# Patient Record
Sex: FEMALE | Race: WHITE | NOT HISPANIC OR LATINO | Employment: FULL TIME | URBAN - METROPOLITAN AREA
[De-identification: names, ages, dates, MRNs, and addresses within clinical notes are randomized per-mention and may not be internally consistent; named-entity substitution may affect disease eponyms.]

---

## 2024-06-04 ENCOUNTER — LAB VISIT (OUTPATIENT)
Dept: LAB | Facility: HOSPITAL | Age: 24
End: 2024-06-04
Payer: COMMERCIAL

## 2024-06-04 ENCOUNTER — OFFICE VISIT (OUTPATIENT)
Dept: FAMILY MEDICINE | Facility: CLINIC | Age: 24
End: 2024-06-04
Payer: COMMERCIAL

## 2024-06-04 VITALS
OXYGEN SATURATION: 99 % | HEART RATE: 78 BPM | SYSTOLIC BLOOD PRESSURE: 135 MMHG | BODY MASS INDEX: 40.15 KG/M2 | HEIGHT: 61 IN | DIASTOLIC BLOOD PRESSURE: 88 MMHG | RESPIRATION RATE: 18 BRPM | WEIGHT: 212.63 LBS | TEMPERATURE: 98 F

## 2024-06-04 DIAGNOSIS — F41.9 ANXIETY: ICD-10-CM

## 2024-06-04 DIAGNOSIS — Z00.00 ENCOUNTER FOR WELLNESS EXAMINATION: Primary | ICD-10-CM

## 2024-06-04 DIAGNOSIS — F33.1 MODERATE EPISODE OF RECURRENT MAJOR DEPRESSIVE DISORDER: ICD-10-CM

## 2024-06-04 DIAGNOSIS — R10.9 ABDOMINAL PAIN, UNSPECIFIED ABDOMINAL LOCATION: ICD-10-CM

## 2024-06-04 DIAGNOSIS — Z00.00 ENCOUNTER FOR WELLNESS EXAMINATION: ICD-10-CM

## 2024-06-04 DIAGNOSIS — R53.83 FATIGUE, UNSPECIFIED TYPE: ICD-10-CM

## 2024-06-04 LAB
C TRACH DNA SPEC QL NAA+PROBE: NOT DETECTED
ESTRADIOL SERPL HS-MCNC: 43 PG/ML
FERRITIN SERPL-MCNC: 41.23 NG/ML (ref 4.63–204)
FSH SERPL-ACNC: 4.52 MIU/ML
LH SERPL-ACNC: 5.77 MIU/ML
N GONORRHOEA DNA SPEC QL NAA+PROBE: NOT DETECTED
PROGEST SERPL-MCNC: <0.5 NG/ML
SOURCE (OHS): NORMAL

## 2024-06-04 PROCEDURE — 1159F MED LIST DOCD IN RCRD: CPT | Mod: CPTII,,,

## 2024-06-04 PROCEDURE — 82728 ASSAY OF FERRITIN: CPT

## 2024-06-04 PROCEDURE — 83001 ASSAY OF GONADOTROPIN (FSH): CPT

## 2024-06-04 PROCEDURE — 99385 PREV VISIT NEW AGE 18-39: CPT | Mod: S$PBB,,,

## 2024-06-04 PROCEDURE — 36415 COLL VENOUS BLD VENIPUNCTURE: CPT

## 2024-06-04 PROCEDURE — 99205 OFFICE O/P NEW HI 60 MIN: CPT | Mod: PBBFAC,PN

## 2024-06-04 PROCEDURE — 82670 ASSAY OF TOTAL ESTRADIOL: CPT

## 2024-06-04 PROCEDURE — 87591 N.GONORRHOEAE DNA AMP PROB: CPT

## 2024-06-04 PROCEDURE — 83002 ASSAY OF GONADOTROPIN (LH): CPT

## 2024-06-04 PROCEDURE — 99204 OFFICE O/P NEW MOD 45 MIN: CPT | Mod: 25,S$PBB,,

## 2024-06-04 PROCEDURE — 3008F BODY MASS INDEX DOCD: CPT | Mod: CPTII,,,

## 2024-06-04 PROCEDURE — 3079F DIAST BP 80-89 MM HG: CPT | Mod: CPTII,,,

## 2024-06-04 PROCEDURE — 84144 ASSAY OF PROGESTERONE: CPT

## 2024-06-04 PROCEDURE — 1160F RVW MEDS BY RX/DR IN RCRD: CPT | Mod: CPTII,,,

## 2024-06-04 PROCEDURE — 3075F SYST BP GE 130 - 139MM HG: CPT | Mod: CPTII,,,

## 2024-06-04 RX ORDER — HYDROXYZINE PAMOATE 25 MG/1
25 CAPSULE ORAL EVERY 8 HOURS PRN
Qty: 90 CAPSULE | Refills: 1 | Status: SHIPPED | OUTPATIENT
Start: 2024-06-04 | End: 2024-09-02

## 2024-06-04 RX ORDER — FLUOXETINE 10 MG/1
CAPSULE ORAL
Qty: 56 CAPSULE | Refills: 0 | Status: SHIPPED | OUTPATIENT
Start: 2024-06-04 | End: 2024-07-09

## 2024-06-04 NOTE — ASSESSMENT & PLAN NOTE
Start Prozac, additionally patient will utilize Vistaril 25 mg t.i.d. p.r.n. anxiety.  Practice deep breathing or abdominal breathing exercises when anxiety occurs.  Exercise daily. Get sunlight daily.  Avoid caffeine, alcohol and stimulants.  Practice positive phrases and repeat throughout the day, yoga, lavender scents or Chamomile tea will help anxiety.  Set healthy boundaries, avoid people and conversations that increase stress.  Reports any symptoms of suicidal or homicidal ideations immediately, if clinic is closed go to nearest emergency room.

## 2024-06-04 NOTE — ASSESSMENT & PLAN NOTE
New chronic condition, start Prozac 10 mg for 2 weeks, increase to 20 mg for the remainder until follow-up in 4 weeks.  Read positive daily meditations, avoid negative media, set healthy boundaries.  Exercise daily, keep consistent sleep pattern, eat a healthy diet.  Establish good social support, make changes to reduce stress.  Reports any symptoms of suicidal/homicidal ideations or self harm immediately, if clinic is closed go to nearest emergency room.    Discussed use of SSRIs may activate thoughts of suicide.   If patient has any thoughts of harm to self or others stop medication and call clinic or go to ER.   Go to ER for symptoms of confusion, agitation, disorientation, restlessness, fever, tremors, while taking SSRIs as this may be signs of serotonin syndrome.

## 2024-06-04 NOTE — PROGRESS NOTES
Patient Name: Lilly Wray     : 2000    MRN: [id number]     Subjective:     Patient ID: Lilly Wray is a 24 y.o. female.    Chief Complaint:   Chief Complaint   Patient presents with    Establish Care     New patient. Establish care. C/o fatigued, weight gain. C/o depression and anxiety. Family Hx of thyroid issues. Wants to talk about PCOS.         HPI: 2024:  Presents to clinic today to establish care, patient has not been seen by any primary care provider in a few years.  Has been seen by a gynecologist previously, obtaining release of information about patient's Pap smear.  States that she would Pap smear performed on the same day her Nexplanon was placed in .  She is also complaining of IBS, states that she was diagnosed with this in her youth.  States it is not occur every day in her main symptom is diarrhea from this.  States that she normally takes Imodium and tries to avoid the foods that bother her.  Does complain of a vague intermittent abdominal pain that she can not find a correlation to cause of the pain.  States that it is intermittent and will come and go.  He has not having abdominal pain currently.  States that she also is concerned about weight gain, states that she tries to eat healthy and has inability to lose weight.  Patient denies chest pain, palpitations, and shortness of breath.  Patient denies fever, night sweats, chills, nausea, vomiting, diarrhea, constipation, weight loss, and changes in appetite.    Also has new complaint of anxiety and depression.  States that she has suffered with this for a long time but has never required medication.  Would like to start medication today.  Denies SI/HI, loki hallucinations.  Does have good support system at home, endorses lots of family stressors.        ROS:       12 point review of systems conducted, negative except as stated in the history of present illness. See HPI for details.    History:     Past Medical  "History:   Diagnosis Date    IBS (irritable bowel syndrome)     Scoliosis         History reviewed. No pertinent surgical history.    No family history on file.     Social History     Tobacco Use    Smoking status: Never    Smokeless tobacco: Never   Substance and Sexual Activity    Alcohol use: Yes     Comment: Occ    Drug use: Not Currently    Sexual activity: Not Currently     Birth control/protection: Implant       Current Outpatient Medications   Medication Instructions    FLUoxetine 10 MG capsule Take 1 capsule (10 mg total) by mouth once daily for 14 days, THEN 2 capsules (20 mg total) once daily for 21 days.    hydrOXYzine pamoate (VISTARIL) 25 mg, Oral, Every 8 hours PRN        Review of patient's allergies indicates:  No Known Allergies    Objective:     Visit Vitals  /88 (BP Location: Left arm, Patient Position: Sitting)   Pulse 78   Temp 98.4 °F (36.9 °C) (Oral)   Resp 18   Ht 5' 1" (1.549 m)   Wt 96.4 kg (212 lb 9.6 oz)   LMP  (LMP Unknown)   SpO2 99%   BMI 40.17 kg/m²       Physical Examination:     Physical Exam  Vitals reviewed.   Constitutional:       General: She is not in acute distress.     Appearance: Normal appearance. She is obese. She is not ill-appearing.   HENT:      Head: Normocephalic.      Right Ear: Tympanic membrane, ear canal and external ear normal.      Left Ear: Tympanic membrane, ear canal and external ear normal.      Nose: Nose normal.      Mouth/Throat:      Mouth: Mucous membranes are moist.      Pharynx: Oropharynx is clear.   Eyes:      Extraocular Movements: Extraocular movements intact.      Conjunctiva/sclera: Conjunctivae normal.      Pupils: Pupils are equal, round, and reactive to light.   Cardiovascular:      Rate and Rhythm: Normal rate and regular rhythm.      Pulses: Normal pulses.      Heart sounds: Normal heart sounds.   Pulmonary:      Effort: Pulmonary effort is normal. No respiratory distress.      Breath sounds: Normal breath sounds.   Abdominal:      " General: Abdomen is flat. Bowel sounds are normal. There is no distension.      Palpations: Abdomen is soft.      Tenderness: There is no abdominal tenderness.   Musculoskeletal:         General: Normal range of motion.      Cervical back: Normal range of motion and neck supple.   Skin:     General: Skin is warm and dry.   Neurological:      General: No focal deficit present.      Mental Status: She is alert and oriented to person, place, and time.   Psychiatric:         Mood and Affect: Mood normal.         Behavior: Behavior normal.         Assessment:          ICD-10-CM ICD-9-CM   1. Encounter for wellness examination  Z00.00 V70.0   2. Fatigue, unspecified type  R53.83 780.79   3. Abdominal pain, unspecified abdominal location  R10.9 789.00   4. Anxiety  F41.9 300.00   5. Moderate episode of recurrent major depressive disorder  F33.1 296.32        Plan:     1. Encounter for wellness examination  -     TSH; Future; Expected date: 06/04/2024  -     T4, Free; Future; Expected date: 06/04/2024  -     Hemoglobin A1C; Future; Expected date: 06/04/2024  -     SYPHILIS ANTIBODY (WITH REFLEX RPR); Future; Expected date: 06/04/2024  -     Hepatitis Panel, Acute; Future; Expected date: 06/04/2024  -     Lipid Panel; Future; Expected date: 06/04/2024  -     CBC Auto Differential; Future; Expected date: 06/04/2024  -     Comprehensive Metabolic Panel; Future; Expected date: 06/04/2024  -     HIV 1/2 Ag/Ab (4th Gen); Future; Expected date: 06/04/2024  -     Vitamin D; Future; Expected date: 06/04/2024  -     Chlamydia/GC, PCR; Future; Expected date: 06/04/2024  -     Urinalysis, Reflex to Urine Culture; Future; Expected date: 06/04/2024  -     Ambulatory referral/consult to Obstetrics / Gynecology; Future; Expected date: 06/04/2024    2. Fatigue, unspecified type  Comments:  Hormone panel today, wellness labs.  Orders:  -     Estradiol; Future; Expected date: 06/04/2024  -     Ferritin; Future; Expected date: 06/04/2024  -      Luteinizing Hormone; Future; Expected date: 06/04/2024  -     Progesterone; Future; Expected date: 06/04/2024  -     Follicle Stimulating Hormone; Future; Expected date: 06/04/2024  -     Ambulatory referral/consult to Obstetrics / Gynecology; Future; Expected date: 06/04/2024    3. Abdominal pain, unspecified abdominal location  Comments:  Referral to the Gastro clinic to establish care, ED precautions discussed  Orders:  -     Ambulatory referral/consult to Gastroenterology; Future; Expected date: 06/04/2024    4. Anxiety  Assessment & Plan:  Start Prozac, additionally patient will utilize Vistaril 25 mg t.i.d. p.r.n. anxiety.  Practice deep breathing or abdominal breathing exercises when anxiety occurs.  Exercise daily. Get sunlight daily.  Avoid caffeine, alcohol and stimulants.  Practice positive phrases and repeat throughout the day, yoga, lavender scents or Chamomile tea will help anxiety.  Set healthy boundaries, avoid people and conversations that increase stress.  Reports any symptoms of suicidal or homicidal ideations immediately, if clinic is closed go to nearest emergency room.        Orders:  -     hydrOXYzine pamoate (VISTARIL) 25 MG Cap; Take 1 capsule (25 mg total) by mouth every 8 (eight) hours as needed (anxiety).  Dispense: 90 capsule; Refill: 1    5. Moderate episode of recurrent major depressive disorder  Assessment & Plan:  New chronic condition, start Prozac 10 mg for 2 weeks, increase to 20 mg for the remainder until follow-up in 4 weeks.  Read positive daily meditations, avoid negative media, set healthy boundaries.  Exercise daily, keep consistent sleep pattern, eat a healthy diet.  Establish good social support, make changes to reduce stress.  Reports any symptoms of suicidal/homicidal ideations or self harm immediately, if clinic is closed go to nearest emergency room.    Discussed use of SSRIs may activate thoughts of suicide.   If patient has any thoughts of harm to self or others  stop medication and call clinic or go to ER.   Go to ER for symptoms of confusion, agitation, disorientation, restlessness, fever, tremors, while taking SSRIs as this may be signs of serotonin syndrome.       Orders:  -     FLUoxetine 10 MG capsule; Take 1 capsule (10 mg total) by mouth once daily for 14 days, THEN 2 capsules (20 mg total) once daily for 21 days.  Dispense: 56 capsule; Refill: 0         Follow up in about 4 weeks (around 7/2/2024) for med change fu, review labs, MARIAN/PHQ.    Future Appointments   Date Time Provider Department Center   7/2/2024  8:30 AM Coreen Lewis NP [facility]        Coreen Lewis NP

## 2024-06-05 ENCOUNTER — PATIENT MESSAGE (OUTPATIENT)
Dept: FAMILY MEDICINE | Facility: CLINIC | Age: 24
End: 2024-06-05
Payer: COMMERCIAL

## 2024-06-07 ENCOUNTER — LAB VISIT (OUTPATIENT)
Dept: LAB | Facility: HOSPITAL | Age: 24
End: 2024-06-07
Payer: COMMERCIAL

## 2024-06-07 ENCOUNTER — PATIENT MESSAGE (OUTPATIENT)
Dept: FAMILY MEDICINE | Facility: CLINIC | Age: 24
End: 2024-06-07
Payer: COMMERCIAL

## 2024-06-07 DIAGNOSIS — Z00.00 ENCOUNTER FOR WELLNESS EXAMINATION: ICD-10-CM

## 2024-06-07 LAB
25(OH)D3+25(OH)D2 SERPL-MCNC: 10 NG/ML (ref 30–80)
ALBUMIN SERPL-MCNC: 3.9 G/DL (ref 3.5–5)
ALBUMIN/GLOB SERPL: 1.1 RATIO (ref 1.1–2)
ALP SERPL-CCNC: 85 UNIT/L (ref 40–150)
ALT SERPL-CCNC: 24 UNIT/L (ref 0–55)
ANION GAP SERPL CALC-SCNC: 9 MEQ/L
AST SERPL-CCNC: 20 UNIT/L (ref 5–34)
BACTERIA #/AREA URNS AUTO: ABNORMAL /HPF
BASOPHILS # BLD AUTO: 0.07 X10(3)/MCL
BASOPHILS NFR BLD AUTO: 1 %
BILIRUB SERPL-MCNC: 0.5 MG/DL
BILIRUB UR QL STRIP.AUTO: NEGATIVE
BUN SERPL-MCNC: 8.4 MG/DL (ref 7–18.7)
CALCIUM SERPL-MCNC: 9.4 MG/DL (ref 8.4–10.2)
CHLORIDE SERPL-SCNC: 106 MMOL/L (ref 98–107)
CHOLEST SERPL-MCNC: 171 MG/DL
CHOLEST/HDLC SERPL: 4 {RATIO} (ref 0–5)
CLARITY UR: CLEAR
CO2 SERPL-SCNC: 23 MMOL/L (ref 22–29)
COLOR UR AUTO: ABNORMAL
CREAT SERPL-MCNC: 0.82 MG/DL (ref 0.55–1.02)
CREAT/UREA NIT SERPL: 10
EOSINOPHIL # BLD AUTO: 0.32 X10(3)/MCL (ref 0–0.9)
EOSINOPHIL NFR BLD AUTO: 4.5 %
ERYTHROCYTE [DISTWIDTH] IN BLOOD BY AUTOMATED COUNT: 13.2 % (ref 11.5–17)
EST. AVERAGE GLUCOSE BLD GHB EST-MCNC: 131.2 MG/DL
GFR SERPLBLD CREATININE-BSD FMLA CKD-EPI: >60 ML/MIN/1.73/M2
GLOBULIN SER-MCNC: 3.6 GM/DL (ref 2.4–3.5)
GLUCOSE SERPL-MCNC: 138 MG/DL (ref 74–100)
GLUCOSE UR QL STRIP: NORMAL
HAV IGM SERPL QL IA: NONREACTIVE
HBA1C MFR BLD: 6.2 %
HBV CORE IGM SERPL QL IA: NONREACTIVE
HBV SURFACE AG SERPL QL IA: NONREACTIVE
HCT VFR BLD AUTO: 46.7 % (ref 37–47)
HCV AB SERPL QL IA: NONREACTIVE
HDLC SERPL-MCNC: 48 MG/DL (ref 35–60)
HGB BLD-MCNC: 15.6 G/DL (ref 12–16)
HGB UR QL STRIP: NEGATIVE
HIV 1+2 AB+HIV1 P24 AG SERPL QL IA: NONREACTIVE
HYALINE CASTS #/AREA URNS LPF: ABNORMAL /LPF
IMM GRANULOCYTES # BLD AUTO: 0.03 X10(3)/MCL (ref 0–0.04)
IMM GRANULOCYTES NFR BLD AUTO: 0.4 %
KETONES UR QL STRIP: NEGATIVE
LDLC SERPL CALC-MCNC: 105 MG/DL (ref 50–140)
LEUKOCYTE ESTERASE UR QL STRIP: NEGATIVE
LYMPHOCYTES # BLD AUTO: 2.68 X10(3)/MCL (ref 0.6–4.6)
LYMPHOCYTES NFR BLD AUTO: 38 %
MCH RBC QN AUTO: 30.5 PG (ref 27–31)
MCHC RBC AUTO-ENTMCNC: 33.4 G/DL (ref 33–36)
MCV RBC AUTO: 91.4 FL (ref 80–94)
MONOCYTES # BLD AUTO: 0.46 X10(3)/MCL (ref 0.1–1.3)
MONOCYTES NFR BLD AUTO: 6.5 %
MUCOUS THREADS URNS QL MICRO: ABNORMAL /LPF
NEUTROPHILS # BLD AUTO: 3.5 X10(3)/MCL (ref 2.1–9.2)
NEUTROPHILS NFR BLD AUTO: 49.6 %
NITRITE UR QL STRIP: NEGATIVE
NRBC BLD AUTO-RTO: 0 %
PH UR STRIP: 5.5 [PH]
PLATELET # BLD AUTO: 360 X10(3)/MCL (ref 130–400)
PMV BLD AUTO: 11.4 FL (ref 7.4–10.4)
POTASSIUM SERPL-SCNC: 4.1 MMOL/L (ref 3.5–5.1)
PROT SERPL-MCNC: 7.5 GM/DL (ref 6.4–8.3)
PROT UR QL STRIP: NEGATIVE
RBC # BLD AUTO: 5.11 X10(6)/MCL (ref 4.2–5.4)
RBC #/AREA URNS AUTO: ABNORMAL /HPF
SODIUM SERPL-SCNC: 138 MMOL/L (ref 136–145)
SP GR UR STRIP.AUTO: 1.02 (ref 1–1.03)
SQUAMOUS #/AREA URNS LPF: ABNORMAL /HPF
T PALLIDUM AB SER QL: NONREACTIVE
T4 FREE SERPL-MCNC: 1.01 NG/DL (ref 0.7–1.48)
TRIGL SERPL-MCNC: 91 MG/DL (ref 37–140)
TSH SERPL-ACNC: 1.32 UIU/ML (ref 0.35–4.94)
UROBILINOGEN UR STRIP-ACNC: NORMAL
VLDLC SERPL CALC-MCNC: 18 MG/DL
WBC # SPEC AUTO: 7.06 X10(3)/MCL (ref 4.5–11.5)
WBC #/AREA URNS AUTO: ABNORMAL /HPF

## 2024-06-07 PROCEDURE — 85025 COMPLETE CBC W/AUTO DIFF WBC: CPT

## 2024-06-07 RX ORDER — ERGOCALCIFEROL 1.25 MG/1
50000 CAPSULE ORAL
Qty: 12 CAPSULE | Refills: 0 | Status: SHIPPED | OUTPATIENT
Start: 2024-06-07 | End: 2024-08-24

## 2024-06-26 ENCOUNTER — E-VISIT (OUTPATIENT)
Dept: FAMILY MEDICINE | Facility: CLINIC | Age: 24
End: 2024-06-26
Payer: COMMERCIAL

## 2024-06-26 ENCOUNTER — HOSPITAL ENCOUNTER (OUTPATIENT)
Dept: RADIOLOGY | Facility: HOSPITAL | Age: 24
Discharge: HOME | End: 2024-06-26
Payer: COMMERCIAL

## 2024-06-26 DIAGNOSIS — M79.645 FINGER PAIN, LEFT: ICD-10-CM

## 2024-06-26 DIAGNOSIS — M79.645 FINGER PAIN, LEFT: Primary | ICD-10-CM

## 2024-06-26 PROCEDURE — 73140 X-RAY EXAM OF FINGER(S): CPT | Mod: TC,PN,LT

## 2024-06-26 NOTE — PROGRESS NOTES
Patient ID: Lilly Wray is a 24 y.o. female.    Chief Complaint: left index finger pain    The patient initiated a request through [facility] on 6/26/2024 for evaluation and management with a chief complaint of left index finger pain     I evaluated the questionnaire submission on 6/26/2024.    [facility]    6/26/2024  2:11 PM CDT - Filed by Patient   Do you agree to participate in an E-Visit? Yes   If you have any of the following symptoms, please present to your local emergency room or call 911:  I acknowledge   Are you pregnant, could you be pregnant, or are you breast feeding? None of the above   What is the main issue you would like addressed today? Feeling a lump on finger and pain   Please describe your symptoms When finger gets bumped on a certain spot its very painful   Where is your problem located? Near the tip of index finger   How severe are your symptoms? Moderate   Have you had these symptoms before? No   How long have you been having these symptoms? For one to four weeks   Please list any medications or treatments you have used for your condition and indicate if it was effective or not. None   What makes this feel better? Nothing   What makes this feel worse? Bumping or touching/moving an object at the wrong angle. I noticed putting something cold on it when in pain makes it hurt more   Are these symptoms related to a condition that you currently have? No   Please describe any probable cause for these symptoms Maybe finger injury   Provide any additional information you feel is important.    Please attach any relevant images or files    Are you able to take your vital signs? No         Encounter Diagnosis   Name Primary?    Finger pain, left Yes        Orders Placed This Encounter   Procedures    X-Ray Finger 2 or More Views Left     Standing Status:   Future     Number of Occurrences:   1     Standing Expiration Date:   6/26/2025     Order Specific Question:   May the Radiologist  modify the order per protocol to meet the clinical needs of the patient?     Answer:   Yes     Order Specific Question:   Release to patient     Answer:   Immediate            Follow up if symptoms worsen or fail to improve.      E-Visit Time Tracking:    Day 1 Time (in minutes): 7  Day 2 Time (in minutes): 1    Total Time (in minutes): 8

## 2024-07-02 ENCOUNTER — PATIENT MESSAGE (OUTPATIENT)
Dept: FAMILY MEDICINE | Facility: CLINIC | Age: 24
End: 2024-07-02

## 2024-07-02 ENCOUNTER — OFFICE VISIT (OUTPATIENT)
Dept: FAMILY MEDICINE | Facility: CLINIC | Age: 24
End: 2024-07-02
Payer: COMMERCIAL

## 2024-07-02 VITALS
DIASTOLIC BLOOD PRESSURE: 76 MMHG | TEMPERATURE: 98 F | BODY MASS INDEX: 39.78 KG/M2 | RESPIRATION RATE: 18 BRPM | HEART RATE: 77 BPM | WEIGHT: 210.69 LBS | OXYGEN SATURATION: 99 % | SYSTOLIC BLOOD PRESSURE: 113 MMHG | HEIGHT: 61 IN

## 2024-07-02 DIAGNOSIS — J45.20 MILD INTERMITTENT ASTHMA WITHOUT COMPLICATION: Primary | ICD-10-CM

## 2024-07-02 DIAGNOSIS — Z00.00 ENCOUNTER FOR WELLNESS EXAMINATION: ICD-10-CM

## 2024-07-02 DIAGNOSIS — F33.1 MODERATE EPISODE OF RECURRENT MAJOR DEPRESSIVE DISORDER: ICD-10-CM

## 2024-07-02 DIAGNOSIS — N89.8 DISCHARGE OF VAGINA: ICD-10-CM

## 2024-07-02 PROBLEM — J45.909 ASTHMA: Status: ACTIVE | Noted: 2024-07-02

## 2024-07-02 LAB
CLUE CELLS VAG QL WET PREP: ABNORMAL
T VAGINALIS VAG QL WET PREP: ABNORMAL
WBC #/AREA VAG WET PREP: ABNORMAL
YEAST SPEC QL WET PREP: ABNORMAL

## 2024-07-02 PROCEDURE — 3074F SYST BP LT 130 MM HG: CPT | Mod: CPTII,,,

## 2024-07-02 PROCEDURE — 3044F HG A1C LEVEL LT 7.0%: CPT | Mod: CPTII,,,

## 2024-07-02 PROCEDURE — 87210 SMEAR WET MOUNT SALINE/INK: CPT

## 2024-07-02 PROCEDURE — 99214 OFFICE O/P EST MOD 30 MIN: CPT | Mod: S$PBB,,,

## 2024-07-02 PROCEDURE — 1160F RVW MEDS BY RX/DR IN RCRD: CPT | Mod: CPTII,,,

## 2024-07-02 PROCEDURE — 1159F MED LIST DOCD IN RCRD: CPT | Mod: CPTII,,,

## 2024-07-02 PROCEDURE — 99214 OFFICE O/P EST MOD 30 MIN: CPT | Mod: PBBFAC,PN

## 2024-07-02 PROCEDURE — 3078F DIAST BP <80 MM HG: CPT | Mod: CPTII,,,

## 2024-07-02 PROCEDURE — 3008F BODY MASS INDEX DOCD: CPT | Mod: CPTII,,,

## 2024-07-02 RX ORDER — ALBUTEROL SULFATE 90 UG/1
2 AEROSOL, METERED RESPIRATORY (INHALATION) EVERY 6 HOURS PRN
Qty: 18 G | Refills: 5 | Status: SHIPPED | OUTPATIENT
Start: 2024-07-02 | End: 2025-07-02

## 2024-07-02 RX ORDER — FLUOXETINE HYDROCHLORIDE 20 MG/1
20 CAPSULE ORAL DAILY
Qty: 90 CAPSULE | Refills: 1 | Status: SHIPPED | OUTPATIENT
Start: 2024-07-02 | End: 2024-12-29

## 2024-07-02 NOTE — PROGRESS NOTES
Patient Name: Lilly Wray     : 2000    MRN: [id number]     Subjective:     Patient ID: Lilly Wray is a 24 y.o. female.    Chief Complaint:   Chief Complaint   Patient presents with    Follow-up     F/u appointment. States isn't as fatigued. Requesting albuterol inhaler. Hx of asthma.         HPI: 2024:  Patient presents to clinic today to review labs. Discussed labs at length with patient and all questions were answered to patients understanding. Patient has no new acute complaints today.  States that she does have history of asthma in his requesting asthma inhaler, has not had flare up requiring treatment but would like to be safe.  Does not have shortness a breath or wheezing today.  States that anxiety has been much improved, not as fatigued as she previously was since starting Prozac.  Doing well overall.  The clinic in about 2 weeks if she would like to increase his medication.  Also requesting refill of albuterol inhaler, states that she has not currently having any shortness a breath or wheezing but did have this diagnosis in her youth and does not have a rescue inhaler.  Additionally patient is complaining vaginal discharge, has been diagnosed with BV and yeast infection in the past possible cervicitis, still attempting to get patient set up with a gynecologist as well.      2024:  Presents to clinic today to establish care, patient has not been seen by any primary care provider in a few years.  Has been seen by a gynecologist previously, obtaining release of information about patient's Pap smear.  States that she would Pap smear performed on the same day her Nexplanon was placed in .  She is also complaining of IBS, states that she was diagnosed with this in her youth.  States it is not occur every day in her main symptom is diarrhea from this.  States that she normally takes Imodium and tries to avoid the foods that bother her.  Does complain of a vague intermittent  "abdominal pain that she can not find a correlation to cause of the pain.  States that it is intermittent and will come and go.  He has not having abdominal pain currently.  States that she also is concerned about weight gain, states that she tries to eat healthy and has inability to lose weight.  Patient denies chest pain, palpitations, and shortness of breath.  Patient denies fever, night sweats, chills, nausea, vomiting, diarrhea, constipation, weight loss, and changes in appetite.    Also has new complaint of anxiety and depression.  States that she has suffered with this for a long time but has never required medication.  Would like to start medication today.  Denies SI/HI, loki hallucinations.  Does have good support system at home, endorses lots of family stressors.        ROS:       12 point review of systems conducted, negative except as stated in the history of present illness. See HPI for details.    History:     Past Medical History:   Diagnosis Date    Asthma     IBS (irritable bowel syndrome)     Scoliosis         History reviewed. No pertinent surgical history.    No family history on file.     Social History     Tobacco Use    Smoking status: Never    Smokeless tobacco: Never   Substance and Sexual Activity    Alcohol use: Yes     Comment: Occ    Drug use: Not Currently    Sexual activity: Not Currently     Birth control/protection: Implant       Current Outpatient Medications   Medication Instructions    albuterol (VENTOLIN HFA) 90 mcg/actuation inhaler 2 puffs, Inhalation, Every 6 hours PRN, Rescue    ergocalciferol (ERGOCALCIFEROL) 50,000 Units, Oral, Every 7 days    FLUoxetine 20 mg, Oral, Daily    hydrOXYzine pamoate (VISTARIL) 25 mg, Oral, Every 8 hours PRN        Review of patient's allergies indicates:  No Known Allergies    Objective:     Visit Vitals  /76 (BP Location: Left arm, Patient Position: Sitting)   Pulse 77   Temp 98.3 °F (36.8 °C) (Oral)   Resp 18   Ht 5' 1" (1.549 m)   Wt " 95.6 kg (210 lb 11.2 oz)   LMP  (LMP Unknown)   SpO2 99%   BMI 39.81 kg/m²       Physical Examination:     Physical Exam    Lab Results:     Chemistry:  Lab Results   Component Value Date     06/07/2024    K 4.1 06/07/2024    BUN 8.4 06/07/2024    CREATININE 0.82 06/07/2024    EGFRNORACEVR >60 06/07/2024    GLUCOSE 138 (H) 06/07/2024    CALCIUM 9.4 06/07/2024    ALKPHOS 85 06/07/2024    LABPROT 7.5 06/07/2024    ALBUMIN 3.9 06/07/2024    AST 20 06/07/2024    ALT 24 06/07/2024    SRMFHRCN76BN 10 (L) 06/07/2024    TSH 1.317 06/07/2024    XLWFFX7NPQY 1.01 06/07/2024        Lab Results   Component Value Date    HGBA1C 6.2 06/07/2024        Hematology:  Lab Results   Component Value Date    WBC 7.06 06/07/2024    HGB 15.6 06/07/2024    HCT 46.7 06/07/2024     06/07/2024       Lipid Panel:  Lab Results   Component Value Date    CHOL 171 06/07/2024    HDL 48 06/07/2024    .00 06/07/2024    TRIG 91 06/07/2024    TOTALCHOLEST 4 06/07/2024        Urine:  Lab Results   Component Value Date    APPEARANCEUA Clear 06/07/2024    SGUA 1.025 06/07/2024    PROTEINUA Negative 06/07/2024    KETONESUA Negative 06/07/2024    LEUKOCYTESUR Negative 06/07/2024    RBCUA 0-5 06/07/2024    WBCUA 0-5 06/07/2024    BACTERIA None Seen 06/07/2024    SQEPUA Few (A) 06/07/2024    HYALINECASTS None Seen 06/07/2024        Assessment:          ICD-10-CM ICD-9-CM   1. Mild intermittent asthma without complication  J45.20 493.90   2. Moderate episode of recurrent major depressive disorder  F33.1 296.32   3. Encounter for wellness examination  Z00.00 V70.0   4. Discharge of vagina  N89.8 623.5        Plan:     1. Mild intermittent asthma without complication  Assessment & Plan:  Use your rescue inhaler and nebulizer for acute asthma symptoms when they occur. These are your rescue medications and help to relax tight muscles around your airways. If you are having to use these medications more than 2x per week, please notify the clinic as  this could indicate poor asthma control.  Avoid asthma triggers (i.e., pet dander, molds, dust mites, cockroaches, pollen, cigarette smoke, respiratory illnesses, etc)  Stay up-to-date with immunizations, especially the influenza and pneumonia vaccinations      Albuterol rescue inhaler sent to pharmacy    Orders:  -     albuterol (VENTOLIN HFA) 90 mcg/actuation inhaler; Inhale 2 puffs into the lungs every 6 (six) hours as needed for Wheezing. Rescue  Dispense: 18 g; Refill: 5    2. Moderate episode of recurrent major depressive disorder  Assessment & Plan:  Continue Prozac 20 mg daily, update clinic in about 2 weeks if she would like to continue at this dose or increase.  Read positive daily meditations, avoid negative media, set healthy boundaries.  Exercise daily, keep consistent sleep pattern, eat a healthy diet.  Establish good social support, make changes to reduce stress.  Reports any symptoms of suicidal/homicidal ideations or self harm immediately, if clinic is closed go to nearest emergency room.    Discussed use of SSRIs may activate thoughts of suicide.   If patient has any thoughts of harm to self or others stop medication and call clinic or go to ER.   Go to ER for symptoms of confusion, agitation, disorientation, restlessness, fever, tremors, while taking SSRIs as this may be signs of serotonin syndrome.       Orders:  -     FLUoxetine 20 MG capsule; Take 1 capsule (20 mg total) by mouth once daily.  Dispense: 90 capsule; Refill: 1    3. Encounter for wellness examination  -     Ambulatory referral/consult to Gynecology; Future; Expected date: 07/02/2024    4. Discharge of vagina  Comments:  Wet prep in clinic today, scheduled appointment for Pap smear at next office visit.  Orders:  -     Wet Prep, Genital           Future Appointments   Date Time Provider Department Center   8/7/2024 12:45 PM Coreen Lewis NP [facility]        Coreen Lewis NP

## 2024-07-02 NOTE — ASSESSMENT & PLAN NOTE
Continue Prozac 20 mg daily, update clinic in about 2 weeks if she would like to continue at this dose or increase.  Read positive daily meditations, avoid negative media, set healthy boundaries.  Exercise daily, keep consistent sleep pattern, eat a healthy diet.  Establish good social support, make changes to reduce stress.  Reports any symptoms of suicidal/homicidal ideations or self harm immediately, if clinic is closed go to nearest emergency room.    Discussed use of SSRIs may activate thoughts of suicide.   If patient has any thoughts of harm to self or others stop medication and call clinic or go to ER.   Go to ER for symptoms of confusion, agitation, disorientation, restlessness, fever, tremors, while taking SSRIs as this may be signs of serotonin syndrome.

## 2024-07-02 NOTE — ASSESSMENT & PLAN NOTE
Use your rescue inhaler and nebulizer for acute asthma symptoms when they occur. These are your rescue medications and help to relax tight muscles around your airways. If you are having to use these medications more than 2x per week, please notify the clinic as this could indicate poor asthma control.  Avoid asthma triggers (i.e., pet dander, molds, dust mites, cockroaches, pollen, cigarette smoke, respiratory illnesses, etc)  Stay up-to-date with immunizations, especially the influenza and pneumonia vaccinations      Albuterol rescue inhaler sent to pharmacy

## 2024-07-03 ENCOUNTER — LAB VISIT (OUTPATIENT)
Dept: LAB | Facility: HOSPITAL | Age: 24
End: 2024-07-03
Attending: STUDENT IN AN ORGANIZED HEALTH CARE EDUCATION/TRAINING PROGRAM
Payer: COMMERCIAL

## 2024-07-03 DIAGNOSIS — R14.0 GASTRIC TYMPANY: ICD-10-CM

## 2024-07-03 DIAGNOSIS — K62.5 HEMORRHAGE OF RECTUM AND ANUS: ICD-10-CM

## 2024-07-03 DIAGNOSIS — R19.7 DIARRHEA OF PRESUMED INFECTIOUS ORIGIN: ICD-10-CM

## 2024-07-03 DIAGNOSIS — R10.84 ABDOMINAL PAIN, GENERALIZED: Primary | ICD-10-CM

## 2024-07-03 DIAGNOSIS — Z80.0 FAMILY HISTORY OF MALIGNANT NEOPLASM OF GASTROINTESTINAL TRACT: ICD-10-CM

## 2024-07-03 LAB
ALBUMIN SERPL-MCNC: 4.2 G/DL (ref 3.5–5)
ALBUMIN/GLOB SERPL: 1.4 RATIO (ref 1.1–2)
ALLERGEN ALMOND IGE (OLG): <0.1 KUA/L
ALLERGEN CASHEW NUT IGE (OLG): <0.1 KUA/L
ALLERGEN CODFISH IGE (OLG): <0.1 KUA/L
ALLERGEN CRAB IGE (OLG): <0.1 KUA/L
ALLERGEN EGG WHITE IGE (OLG): <0.1 KUA/L
ALLERGEN EGG YOLK IGE (OLG): <0.1 KUA/L
ALLERGEN HAZELNUT IGE (OLG): <0.1 KUA/L
ALLERGEN MILK IGE (OLG): <0.1 KUA/L
ALLERGEN PEANUT IGE (OLG): <0.1 KUA/L
ALLERGEN SALMON IGE (OLG): <0.1 KUA/L
ALLERGEN SCALLOP IGE (OLG): <0.1 KUA/L
ALLERGEN SESAME SEED IGE (OLG): <0.1 KUA/L
ALLERGEN SHRIMP IGE (OLG): <0.1 KUA/L
ALLERGEN SOY BEAN IGE (OLG): <0.1 KUA/L
ALLERGEN TUNA IGE (OLG): <0.1 KUA/L
ALLERGEN WALNUT IGE (OLG): <0.1 KUA/L
ALLERGEN WHEAT IGE (OLG): <0.1 KUA/L
ALP SERPL-CCNC: 89 UNIT/L (ref 40–150)
ALT SERPL-CCNC: 23 UNIT/L (ref 0–55)
ANION GAP SERPL CALC-SCNC: 7 MEQ/L
AST SERPL-CCNC: 21 UNIT/L (ref 5–34)
BASOPHILS # BLD AUTO: 0.08 X10(3)/MCL
BASOPHILS NFR BLD AUTO: 1.1 %
BILIRUB SERPL-MCNC: 0.7 MG/DL
BUN SERPL-MCNC: 9.1 MG/DL (ref 7–18.7)
CALCIUM SERPL-MCNC: 9.6 MG/DL (ref 8.4–10.2)
CHLORIDE SERPL-SCNC: 107 MMOL/L (ref 98–107)
CO2 SERPL-SCNC: 24 MMOL/L (ref 22–29)
CREAT SERPL-MCNC: 0.76 MG/DL (ref 0.55–1.02)
CREAT/UREA NIT SERPL: 12
EOSINOPHIL # BLD AUTO: 0.23 X10(3)/MCL (ref 0–0.9)
EOSINOPHIL NFR BLD AUTO: 3.2 %
ERYTHROCYTE [DISTWIDTH] IN BLOOD BY AUTOMATED COUNT: 13.4 % (ref 11.5–17)
GFR SERPLBLD CREATININE-BSD FMLA CKD-EPI: >60 ML/MIN/1.73/M2
GLOBULIN SER-MCNC: 3.1 GM/DL (ref 2.4–3.5)
GLUCOSE SERPL-MCNC: 115 MG/DL (ref 74–100)
HCT VFR BLD AUTO: 44.8 % (ref 37–47)
HGB BLD-MCNC: 14.8 G/DL (ref 12–16)
IMM GRANULOCYTES # BLD AUTO: 0.02 X10(3)/MCL (ref 0–0.04)
IMM GRANULOCYTES NFR BLD AUTO: 0.3 %
LYMPHOCYTES # BLD AUTO: 2.44 X10(3)/MCL (ref 0.6–4.6)
LYMPHOCYTES NFR BLD AUTO: 33.7 %
MCH RBC QN AUTO: 30.3 PG (ref 27–31)
MCHC RBC AUTO-ENTMCNC: 33 G/DL (ref 33–36)
MCV RBC AUTO: 91.8 FL (ref 80–94)
MONOCYTES # BLD AUTO: 0.43 X10(3)/MCL (ref 0.1–1.3)
MONOCYTES NFR BLD AUTO: 5.9 %
NEUTROPHILS # BLD AUTO: 4.04 X10(3)/MCL (ref 2.1–9.2)
NEUTROPHILS NFR BLD AUTO: 55.8 %
NRBC BLD AUTO-RTO: 0 %
PHADIOTOP IGE QN: 14.6 KU/L
PLATELET # BLD AUTO: 360 X10(3)/MCL (ref 130–400)
PMV BLD AUTO: 11.2 FL (ref 7.4–10.4)
POTASSIUM SERPL-SCNC: 4.5 MMOL/L (ref 3.5–5.1)
PROT SERPL-MCNC: 7.3 GM/DL (ref 6.4–8.3)
RBC # BLD AUTO: 4.88 X10(6)/MCL (ref 4.2–5.4)
SODIUM SERPL-SCNC: 138 MMOL/L (ref 136–145)
WBC # BLD AUTO: 7.24 X10(3)/MCL (ref 4.5–11.5)

## 2024-07-03 PROCEDURE — 82785 ASSAY OF IGE: CPT

## 2024-07-03 PROCEDURE — 86364 TISS TRNSGLTMNASE EA IG CLAS: CPT

## 2024-07-03 PROCEDURE — 36415 COLL VENOUS BLD VENIPUNCTURE: CPT

## 2024-07-03 PROCEDURE — 85025 COMPLETE CBC W/AUTO DIFF WBC: CPT

## 2024-07-03 PROCEDURE — 80053 COMPREHEN METABOLIC PANEL: CPT

## 2024-07-03 PROCEDURE — 82784 ASSAY IGA/IGD/IGG/IGM EACH: CPT

## 2024-07-08 LAB
IGA SERPL-MCNC: 207 MG/DL (ref 61–356)
IMMUNOLOGIST REVIEW: NORMAL
TTG IGA SER IA-ACNC: <1.2 U/ML

## 2024-08-14 ENCOUNTER — OFFICE VISIT (OUTPATIENT)
Dept: FAMILY MEDICINE | Facility: CLINIC | Age: 24
End: 2024-08-14
Payer: COMMERCIAL

## 2024-08-14 VITALS
SYSTOLIC BLOOD PRESSURE: 114 MMHG | OXYGEN SATURATION: 99 % | RESPIRATION RATE: 18 BRPM | HEIGHT: 61 IN | DIASTOLIC BLOOD PRESSURE: 77 MMHG | HEART RATE: 66 BPM | TEMPERATURE: 99 F | BODY MASS INDEX: 38.44 KG/M2 | WEIGHT: 203.63 LBS

## 2024-08-14 DIAGNOSIS — F41.9 ANXIETY: ICD-10-CM

## 2024-08-14 DIAGNOSIS — Z12.4 ENCOUNTER FOR PAPANICOLAOU SMEAR OF CERVIX: Primary | ICD-10-CM

## 2024-08-14 DIAGNOSIS — F33.1 MODERATE EPISODE OF RECURRENT MAJOR DEPRESSIVE DISORDER: ICD-10-CM

## 2024-08-14 DIAGNOSIS — R10.2 PELVIC PAIN: ICD-10-CM

## 2024-08-14 PROCEDURE — 3078F DIAST BP <80 MM HG: CPT | Mod: CPTII,,,

## 2024-08-14 PROCEDURE — 1159F MED LIST DOCD IN RCRD: CPT | Mod: CPTII,,,

## 2024-08-14 PROCEDURE — 3008F BODY MASS INDEX DOCD: CPT | Mod: CPTII,,,

## 2024-08-14 PROCEDURE — 1160F RVW MEDS BY RX/DR IN RCRD: CPT | Mod: CPTII,,,

## 2024-08-14 PROCEDURE — 3044F HG A1C LEVEL LT 7.0%: CPT | Mod: CPTII,,,

## 2024-08-14 PROCEDURE — 99214 OFFICE O/P EST MOD 30 MIN: CPT | Mod: S$PBB,25,,

## 2024-08-14 PROCEDURE — 99214 OFFICE O/P EST MOD 30 MIN: CPT | Mod: PBBFAC,PN

## 2024-08-14 PROCEDURE — 3074F SYST BP LT 130 MM HG: CPT | Mod: CPTII,,,

## 2024-08-14 RX ORDER — FLUOXETINE HYDROCHLORIDE 40 MG/1
40 CAPSULE ORAL DAILY
Qty: 90 CAPSULE | Refills: 3 | Status: SHIPPED | OUTPATIENT
Start: 2024-08-14 | End: 2025-08-14

## 2024-08-14 NOTE — ASSESSMENT & PLAN NOTE
Increase Prozac to 40 mg daily, return to clinic in 1 month to assess efficacy. additionally patient will utilize Vistaril 25 mg t.i.d. p.r.n. anxiety.  Practice deep breathing or abdominal breathing exercises when anxiety occurs.  Exercise daily. Get sunlight daily.  Avoid caffeine, alcohol and stimulants.  Practice positive phrases and repeat throughout the day, yoga, lavender scents or Chamomile tea will help anxiety.  Set healthy boundaries, avoid people and conversations that increase stress.  Reports any symptoms of suicidal or homicidal ideations immediately, if clinic is closed go to nearest emergency room.

## 2024-08-14 NOTE — PROGRESS NOTES
Subjective:       Patient ID: Lilly Wray is a 24 y.o. female.    Chief Complaint:  Follow-up (F/u appointment. PAP exam. LMP- unknown, has Nexplanon. )      History of Present Illness  Patient presents to clinic today for Pap smear, previous pap smear about 2022, unknown results.  Complains of pelvic pain, states it is exacerbated during intercourse.  Denies any postcoital bleeding.  States she believes she has PCOS, no hirsutism or glucose intolerance previously.  Patient denies any abdominal pain, pain or odors.     Additionally patient requesting to increase her Prozac, previously on 20 mg and would like to increase to 40 mg, denies SI/HI, loki hallucinations.      GYN & OB History  No LMP recorded (lmp unknown). Patient has had an implant.   Date of Last Pap: No result found    Review of patient's allergies indicates:  No Known Allergies  Past Medical History:   Diagnosis Date    Asthma     IBS (irritable bowel syndrome)     Scoliosis      OB History   No obstetric history on file.        Review of Systems  Review of Systems   Constitutional:  Negative for chills, fever and weight loss.   HENT:  Negative for ear discharge, nosebleeds and tinnitus.    Eyes:  Negative for blurred vision, photophobia and pain.   Respiratory:  Negative for cough, shortness of breath, wheezing and stridor.    Cardiovascular:  Negative for chest pain, palpitations and orthopnea.   Gastrointestinal:  Negative for abdominal pain, heartburn and nausea.   Genitourinary:  Negative for dysuria, frequency, hematuria and urgency.   Musculoskeletal:  Negative for falls and myalgias.   Skin:  Negative for itching and rash.   Neurological:  Negative for dizziness, sensory change, speech change, focal weakness, seizures, weakness and headaches.   Endo/Heme/Allergies:  Negative for environmental allergies. Does not bruise/bleed easily.   Psychiatric/Behavioral:  Negative for hallucinations and suicidal ideas.       Objective:     "  /77 (BP Location: Right arm, Patient Position: Sitting)   Pulse 66   Temp 98.8 °F (37.1 °C) (Oral)   Resp 18   Ht 5' 1" (1.549 m)   Wt 92.4 kg (203 lb 9.6 oz)   LMP  (LMP Unknown)   SpO2 99%   BMI 38.47 kg/m²   GENERAL: Well-developed female in no acute distress.  SKIN: Normal to inspection, warm and intact.  ABDOMEN: Soft, non tender.  VULVA: General appearance normal; external genitalia with no lesions or erythema.  BLADDER: No tenderness.  VAGINA: Mucosa normal, pink, no abnormal discharge or lesions.  CERVIX: Grossly normal, pink, no erythema or abnormal discharge. Pap smear obtained  PSYCHIATRIC: Patient is oriented to person, place, and time. Mood and affect are normal.    Assessment:     Problem List Items Addressed This Visit       Moderate episode of recurrent major depressive disorder    Relevant Medications    FLUoxetine 40 MG capsule    Anxiety    Current Assessment & Plan     Increase Prozac to 40 mg daily, return to clinic in 1 month to assess efficacy. additionally patient will utilize Vistaril 25 mg t.i.d. p.r.n. anxiety.  Practice deep breathing or abdominal breathing exercises when anxiety occurs.  Exercise daily. Get sunlight daily.  Avoid caffeine, alcohol and stimulants.  Practice positive phrases and repeat throughout the day, yoga, lavender scents or Chamomile tea will help anxiety.  Set healthy boundaries, avoid people and conversations that increase stress.  Reports any symptoms of suicidal or homicidal ideations immediately, if clinic is closed go to nearest emergency room.              Other Visit Diagnoses       Encounter for Papanicolaou smear of cervix    -  Primary    Relevant Orders    Liquid-Based Pap Smear, Screening    Pelvic pain        Relevant Orders    US Pelvis Comp with Transvag NON-OB (xpd            Plan:   Lilly was seen today for follow-up.    Diagnoses and all orders for this visit:    Encounter for Papanicolaou smear of cervix  -     Liquid-Based Pap " Smear, Screening    Moderate episode of recurrent major depressive disorder  -     FLUoxetine 40 MG capsule; Take 1 capsule (40 mg total) by mouth once daily.    Pelvic pain  -     US Pelvis Comp with Transvag NON-OB (xpd; Future    Anxiety         Follow up for routine previously scheduled follow up, results of pap will be available in MyChart.

## 2024-08-21 ENCOUNTER — PATIENT MESSAGE (OUTPATIENT)
Dept: FAMILY MEDICINE | Facility: CLINIC | Age: 24
End: 2024-08-21
Payer: COMMERCIAL

## 2024-09-05 ENCOUNTER — HOSPITAL ENCOUNTER (OUTPATIENT)
Dept: RADIOLOGY | Facility: HOSPITAL | Age: 24
Discharge: HOME | End: 2024-09-05
Payer: COMMERCIAL

## 2024-09-05 DIAGNOSIS — R10.2 PELVIC PAIN: ICD-10-CM

## 2024-09-05 PROCEDURE — 76856 US EXAM PELVIC COMPLETE: CPT | Mod: TC
